# Patient Record
(demographics unavailable — no encounter records)

---

## 2025-03-27 NOTE — HISTORY OF PRESENT ILLNESS
[FreeTextEntry1] : 3/26/2025 74 F with history of mental health disorder, hypothyroidism, HTN, HLD, CKD, , partial hysterectomy who presents for establishment of renal care. The patient has history of hypertension, previously managed by local nephrologist, doesnt frequenlty check BP at home, blood pressure appears controlled. She was on ARB in the past, unsure why D/C'd. She was on varying doses of Amlodipine. SHe is currently taking 2.5 mg of amlodipine daily for BP control. No history of  aldosteronism, FATIMAH, endocrine disorders, CAD, HF,. + HLD- on statin.  The patient has no history of diabetes, No known retinopathy, no peripheral neuropathy. Patient denies chronic use of NSAIDs or herbal medications. Patient denies signs or symptoms consistent with systemic vasculitis or connective tissue disease. Patient denies history of gout or nephrolithiasis. There is no history of kidney biopsy. She has bladde prolapse and wears a pessaryHgb and platelets within acceptable range. No history of FATUMA. Reports being up to date on age appropriate cancer screenings including colonoscopy. The patient is active in ADL's. The patient has good oral intake and hydrates to thirst. Denies fatigue, weakness, headache, dizziness, chest pain, palpitations, shortness of breath, orthopnea, dyspnea on exertion, nausea, vomiting, diarrhea, dysuria, hematuria, foamy urine, fever or chills.

## 2025-03-27 NOTE — PHYSICAL EXAM
[General Appearance - Alert] : alert [General Appearance - In No Acute Distress] : in no acute distress [Neck Appearance] : the appearance of the neck was normal [Neck Cervical Mass (___cm)] : no neck mass was observed [Jugular Venous Distention Increased] : there was no jugular-venous distention [] : no respiratory distress [Auscultation Breath Sounds / Voice Sounds] : lungs were clear to auscultation bilaterally [Heart Rate And Rhythm] : heart rate was normal and rhythm regular [Heart Sounds] : normal S1 and S2 [Heart Sounds Gallop] : no gallops [Murmurs] : no murmurs [Heart Sounds Pericardial Friction Rub] : no pericardial rub [Edema] : there was no peripheral edema [No CVA Tenderness] : no ~M costovertebral angle tenderness [No Spinal Tenderness] : no spinal tenderness [Involuntary Movements] : no involuntary movements were seen [No Focal Deficits] : no focal deficits [Oriented To Time, Place, And Person] : oriented to person, place, and time [Impaired Insight] : insight and judgment were intact [Affect] : the affect was normal [General Appearance - Well Nourished] : well nourished [General Appearance - Well Developed] : well developed [Examination Of The Oral Cavity] : the lips and gums were normal [Abnormal Walk] : normal gait [Musculoskeletal - Swelling] : no joint swelling seen [FreeTextEntry1] : hx bladder prolapse wears pessary

## 2025-03-27 NOTE — ASSESSMENT
[FreeTextEntry1] : 74 F with history of mental health disorder, hypothyroidism, HTN, HLD, CKD, , partial hysterectomy who presents for establishment of renal care.    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 No signs of uremia.  No recent or known history of DM,nephrolithiasis, obstruction, thrombosis, NSAIDs, PPIs, herbal meds, kidney biopsy, dialysis, transplant, or recent IV iodinated contrast. . She has recent UTI (2025) treated with Cipro, ended up admitted with rash, LE edema, MARINA. The LE edema was thought to be upward  titration of CCB (amlodipine), has mostly resolved, her rash has resolved as well. Cipro is now labeled as an allery. Possibly AIN episode . first noticed rise in creatinine around  with creatinine of 1.4 and lithium was stopped at that time, of note her mood is well controlled on her current regimen and does not need to go back on lithium, she reports possible misdiagnosis of bipolar and it might have PPD, either way she reports a stable mood. Previously nephro Dr. Rubin  #CV: History of HTN, blood pressure (BP) controlled. Reviewed blood pressure management including goal BP above 100/60 but less than 140/90 including controlling modifiable factors such as exercise/physical therapy, attaining optimal BMI, maintaining a diet low in sodium and cholesterol and avoiding medications that such as OTC decongestants.  No LE edema. No history of CVA, FATIMAH, aldosteronism, endocrine disorders, CAD, HF.  HLD on statin.  #Acid-Base: no evidence of acid/base disorder from renal dysfunction; CO2 within range.  #Mineral-Bone Metabolism: will trend labs (Calcium, Phosphorus, Vitamin D 25-Hydroxy, intact PTH).  #Hematology: Hemoglobin stable; Platelets stable; no history of iron deficient anemia, no history of FATUMA administration.  #Patient Education: I discussed during this visit or reiterated from previous visits the meaning and implications of the CKD diagnosis. I have explained the diagnostic and therapeutic approach to this disease including general prognostic information. Education done regarding the importance of preventive measures such as adequate oral hydration, healthy nutrition, weight, blood pressure and glucose management as well as avoidance of iodinated IV contrast as able and NSAIDs such as   ibuprofen (Advil, Motrin), naproxen (Aleve), celecoxib (Celebrex), diclofenac (Voltaren), etodolac (Lodine), indomethacin (Indocin),  ketolorac (Toradol)  meloxicam (Mobic) and/or piroxicam (Deldene).  PLAN: - continue current antihypertensive regimen - no need for renal diet, drink to thirst - avoid NSAIDs and nephrotoxins, including iodinated IV contrast as able - dose any new medications for current eGFR - obtain progress note, labs and renal imaging from Dr. Rubin - follow up lab results from today - follow up visit, on-site, with Nephrology MD as planned  Patient verbalized understanding of above, they will call our office if any issues or concerns arise. Seen with Dr. Knox --- Carline Garcia NP ([facility]) Nephrology - Nurse Practitioner [facility]  P: [phone] | F: [phone] | Teams | [place] ---

## 2025-03-27 NOTE — ASSESSMENT
[FreeTextEntry1] : 74 F with history of mental health disorder, hypothyroidism, HTN, HLD, CKD, , partial hysterectomy who presents for establishment of renal care.    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 No signs of uremia.  No recent or known history of DM,nephrolithiasis, obstruction, thrombosis, NSAIDs, PPIs, herbal meds, kidney biopsy, dialysis, transplant, or recent IV iodinated contrast. . She has recent UTI (2025) treated with Cipro, ended up admitted with rash, LE edema, MARINA. The LE edema was thought to be upward  titration of CCB (amlodipine), has mostly resolved, her rash has resolved as well. Cipro is now labeled as an allery. Possibly AIN episode . first noticed rise in creatinine around  with creatinine of 1.4 and lithium was stopped at that time, of note her mood is well controlled on her current regimen and does not need to go back on lithium, she reports possible misdiagnosis of bipolar and it might have PPD, either way she reports a stable mood. Previously nephro Dr. Rubin  #CV: History of HTN, blood pressure (BP) controlled. Reviewed blood pressure management including goal BP above 100/60 but less than 140/90 including controlling modifiable factors such as exercise/physical therapy, attaining optimal BMI, maintaining a diet low in sodium and cholesterol and avoiding medications that such as OTC decongestants.  No LE edema. No history of CVA, FATIMAH, aldosteronism, endocrine disorders, CAD, HF.  HLD on statin.  #Acid-Base: no evidence of acid/base disorder from renal dysfunction; CO2 within range.  #Mineral-Bone Metabolism: will trend labs (Calcium, Phosphorus, Vitamin D 25-Hydroxy, intact PTH).  #Hematology: Hemoglobin stable; Platelets stable; no history of iron deficient anemia, no history of FATUMA administration.  #Patient Education: I discussed during this visit or reiterated from previous visits the meaning and implications of the CKD diagnosis. I have explained the diagnostic and therapeutic approach to this disease including general prognostic information. Education done regarding the importance of preventive measures such as adequate oral hydration, healthy nutrition, weight, blood pressure and glucose management as well as avoidance of iodinated IV contrast as able and NSAIDs such as   ibuprofen (Advil, Motrin), naproxen (Aleve), celecoxib (Celebrex), diclofenac (Voltaren), etodolac (Lodine), indomethacin (Indocin),  ketolorac (Toradol)  meloxicam (Mobic) and/or piroxicam (Deldene).  PLAN: - continue current antihypertensive regimen - no need for renal diet, drink to thirst - avoid NSAIDs and nephrotoxins, including iodinated IV contrast as able - dose any new medications for current eGFR - obtain progress note, labs and renal imaging from Dr. Rubin - follow up lab results from today - follow up visit, on-site, with Nephrology MD as planned  Patient verbalized understanding of above, they will call our office if any issues or concerns arise. Seen with Dr. Knox --- Carline Garcia NP ([facility]) Nephrology - Nurse Practitioner [facility]  P: [phone] | F: [phone] | Teams | [place] ---

## 2025-04-22 NOTE — HISTORY OF PRESENT ILLNESS
[Never] : never [TextBox_4] : 74F with vertigo, CKD from lithium use, here to eval cough she has had for 6 weeks.  Last seen in 2019, known to have GGO at bases of lungs at the time.  Since she was last seen she has not had any significant issue with her breathing or cough.  About 6 weeks ago developed a productive cough, took benzonatate and overall cough slowly improving.  Has sinus congestion/pnd.  She was previously working out with a  and playing pickleball without issue, she has had to pull back from this bc of the vertigo.

## 2025-04-22 NOTE — PROCEDURE
[FreeTextEntry1] : PFT: Normal spirometry without a significant bd response.  Lung volumes normal. DLCO moderately reduced.  Exhaled nitric oxide: 17, normal  Prior exams reviewed: Spirometry: Normal without a significant bronchodilator response.  Lung volumes normal, DLCO 59% predicted.  stable   PFT: Normal spirometry without a significant bronchodilator response.  Lung volumes normal. DLCO reduced at 58% predicted.  CT Chest from May 2019 reviewed: see chart, nodules and GGO's

## 2025-04-22 NOTE — ASSESSMENT
[FreeTextEntry1] : suggest starting flonase for PND/sinus congestion cough can very likely just be post viral in nature.  suggest obtaining updated ct chest now to eval ILD at lung bases from 2019  A total of 45 minutes was spent on this encounter including history taking, chart review, physical examination, testing interpretation and treatment plan.

## 2025-05-02 NOTE — ASSESSMENT
[FreeTextEntry1] : cough resolving on its own she will get a cd for me with ct images so I can review myself , will call her once i look at it has fu with nephrologist for kidney disease/cysts

## 2025-05-02 NOTE — PROCEDURE
[FreeTextEntry1] : report from [facility] reviewed--see chart   Reviewed:  PFT: Normal spirometry without a significant bd response.  Lung volumes normal. DLCO moderately reduced.  Exhaled nitric oxide: 17, normal  Prior exams reviewed: Spirometry: Normal without a significant bronchodilator response.  Lung volumes normal, DLCO 59% predicted.  stable   PFT: Normal spirometry without a significant bronchodilator response.  Lung volumes normal. DLCO reduced at 58% predicted.  CT Chest from May 2019 reviewed: see chart, nodules and GGO's

## 2025-05-02 NOTE — HISTORY OF PRESENT ILLNESS
[TextBox_4] : cough getting better on its own didn't take nasal spray bc of concern for ckd/interactions with meds had ct chest, few areas of atelectasis

## 2025-05-02 NOTE — REASON FOR VISIT
[Home] : at home, [unfilled] , at the time of the visit. [Medical Office: ([facility])___] : at the medical office located in  [Telehealth (audio & video)] : This visit was provided via telehealth using real-time 2-way audio visual technology. [Verbal consent obtained from patient] : the patient, [unfilled]

## 2025-07-23 NOTE — PHYSICAL EXAM
[General Appearance - Alert] : alert [General Appearance - In No Acute Distress] : in no acute distress [General Appearance - Well Nourished] : well nourished [General Appearance - Well Developed] : well developed [Examination Of The Oral Cavity] : the lips and gums were normal [Neck Appearance] : the appearance of the neck was normal [Neck Cervical Mass (___cm)] : no neck mass was observed [Jugular Venous Distention Increased] : there was no jugular-venous distention [Auscultation Breath Sounds / Voice Sounds] : lungs were clear to auscultation bilaterally [Heart Rate And Rhythm] : heart rate was normal and rhythm regular [Heart Sounds] : normal S1 and S2 [Heart Sounds Gallop] : no gallops [Murmurs] : no murmurs [Heart Sounds Pericardial Friction Rub] : no pericardial rub [Edema] : there was no peripheral edema [No CVA Tenderness] : no ~M costovertebral angle tenderness [No Spinal Tenderness] : no spinal tenderness [Abnormal Walk] : normal gait [Involuntary Movements] : no involuntary movements were seen [Musculoskeletal - Swelling] : no joint swelling seen [No Focal Deficits] : no focal deficits [Oriented To Time, Place, And Person] : oriented to person, place, and time [Impaired Insight] : insight and judgment were intact [Affect] : the affect was normal [Bowel Sounds] : normal bowel sounds [Abdomen Soft] : soft [Abdomen Tenderness] : non-tender [Skin Color & Pigmentation] : normal skin color and pigmentation [] : no rash [FreeTextEntry1] : hx bladder prolapse wears pessary

## 2025-07-23 NOTE — HISTORY OF PRESENT ILLNESS
[FreeTextEntry1] : Follow up CKD/ MARINA   74 F with history of mental health disorder, hypothyroidism, HTN, HLD, CKD, , partial hysterectomy who presents for establishment of renal care.   The patient has history of hypertension, previously managed by local nephrologist, doesnt frequenlty check BP at home, blood pressure appears controlled. She was on ARB in the past, unsure why D/C'd. She was on varying doses of Amlodipine. SHe is currently taking 2.5 mg of amlodipine daily for BP control. No history of  aldosteronism, FATIMAH, endocrine disorders, CAD, HF,. + HLD- on statin.  The patient has no history of diabetes, No known retinopathy, no peripheral neuropathy. Patient denies chronic use of NSAIDs or herbal medications. Patient denies signs or symptoms consistent with systemic vasculitis or connective tissue disease. Patient denies history of gout or nephrolithiasis.   States she developed kidney disease from Lithium use and it was discontinued.  creatinine in  was 1.15 and subsequently has been in the 2s at least since   she never had a kidney biopsy but was told by her nephrologist that it was the lithium   last creatinine earlier this month in 2025 was 2.34   overall since last visit, she states she has been diagnosed with vestibular symptoms  she has been doing okay

## 2025-07-23 NOTE — ASSESSMENT
[FreeTextEntry1] : 74 F with history of mental health disorder, hypothyroidism, HTN, HLD, CKD, , partial hysterectomy who presents for establishment of renal care.    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 No signs of uremia.  No recent or known history of DM,nephrolithiasis, obstruction, thrombosis, NSAIDs, PPIs, herbal meds, kidney biopsy, dialysis, transplant, or recent IV iodinated contrast. . She has recent UTI (2025) treated with Cipro, ended up admitted with rash, LE edema, MARINA. The LE edema was thought to be upward  titration of CCB (amlodipine), has mostly resolved, her rash has resolved as well. Cipro is now labeled as an allergy. Possibly AIN episode . first noticed rise in creatinine around  with creatinine of 1.4 and lithium was stopped at that time, of note her mood is well controlled on her current regimen and does not need to go back on lithium, she reports possible misdiagnosis of bipolar and it might have PPD, either way she reports a stable mood. Previously nephro Dr. Rubin  last creatinine is 2.16   #CV: History of HTN, blood pressure (BP) controlled. Reviewed blood pressure management including goal BP above 100/60 but less than 140/90 including controlling modifiable factors such as exercise/physical therapy, attaining optimal BMI, maintaining a diet low in sodium and cholesterol and avoiding medications that such as OTC decongestants.  No LE edema. No history of CVA, FATIMAH, aldosteronism, endocrine disorders, CAD, HF.  HLD on statin.  #Acid-Base: no evidence of acid/base disorder from renal dysfunction; CO2 within range.  #Mineral-Bone Metabolism: will trend labs (Calcium, Phosphorus, Vitamin D 25-Hydroxy, intact PTH).  #Hematology: Hemoglobin stable; Platelets stable; no history of iron deficient anemia, no history of FATUMA administration.  #Patient Education: I discussed during this visit or reiterated from previous visits the meaning and implications of the CKD diagnosis. I have explained the diagnostic and therapeutic approach to this disease including general prognostic information. Education done regarding the importance of preventive measures such as adequate oral hydration, healthy nutrition, weight, blood pressure and glucose management as well as avoidance of iodinated IV contrast as able and NSAIDs such as   ibuprofen (Advil, Motrin), naproxen (Aleve), celecoxib (Celebrex), diclofenac (Voltaren), etodolac (Lodine), indomethacin (Indocin),  ketolorac (Toradol)  meloxicam (Mobic) and/or piroxicam (Deldene).  PLAN: - We discussed the role of a kidney biopsy - considering kidney function is stable, would hold off. She agrees and is not keen on pursuing.  - continue current antihypertensive regimen - no need for renal diet, drink to thirst - avoid NSAIDs and nephrotoxins, including iodinated IV contrast as able - dose any new medications for current eGFR